# Patient Record
Sex: FEMALE | Race: WHITE | NOT HISPANIC OR LATINO
[De-identification: names, ages, dates, MRNs, and addresses within clinical notes are randomized per-mention and may not be internally consistent; named-entity substitution may affect disease eponyms.]

---

## 2019-05-08 ENCOUNTER — TRANSCRIPTION ENCOUNTER (OUTPATIENT)
Age: 34
End: 2019-05-08

## 2021-05-21 ENCOUNTER — TRANSCRIPTION ENCOUNTER (OUTPATIENT)
Age: 36
End: 2021-05-21

## 2022-10-30 ENCOUNTER — NON-APPOINTMENT (OUTPATIENT)
Age: 37
End: 2022-10-30

## 2022-11-09 ENCOUNTER — OFFICE (OUTPATIENT)
Dept: URBAN - METROPOLITAN AREA CLINIC 97 | Facility: CLINIC | Age: 37
Setting detail: OPHTHALMOLOGY
End: 2022-11-09
Payer: COMMERCIAL

## 2022-11-09 DIAGNOSIS — H52.13: ICD-10-CM

## 2022-11-09 PROCEDURE — 99024 POSTOP FOLLOW-UP VISIT: CPT | Performed by: OPHTHALMOLOGY

## 2022-11-09 ASSESSMENT — VISUAL ACUITY
OS_BCVA: 20/20
OD_BCVA: 20/20

## 2022-11-09 ASSESSMENT — REFRACTION_MANIFEST
OD_AXIS: 180
OS_AXIS: 180
OS_VA1: 20/20
OS_SPHERE: -3.75
OS_CYLINDER: -0.25
OD_SPHERE: -4.00
OD_CYLINDER: -0.50
OU_VA: 20/20
OD_VA1: 20/20

## 2022-11-09 ASSESSMENT — REFRACTION_AUTOREFRACTION
OS_SPHERE: +0.50
OD_AXIS: 094
OS_AXIS: 132
OD_CYLINDER: -0.25
OD_SPHERE: +0.25
OS_CYLINDER: -0.25

## 2022-11-09 ASSESSMENT — KERATOMETRY
OD_K2POWER_DIOPTERS: 4.25
OS_AXISANGLE_DEGREES: 083
OS_K2POWER_DIOPTERS: 43.50
OS_K1POWER_DIOPTERS: 42.75
OD_K1POWER_DIOPTERS: 42.25
OD_AXISANGLE_DEGREES: 085
METHOD_AUTO_MANUAL: AUTO

## 2022-11-09 ASSESSMENT — SPHEQUIV_DERIVED
OD_SPHEQUIV: -4.25
OD_SPHEQUIV: 0.125
OS_SPHEQUIV: 0.375
OS_SPHEQUIV: -3.875

## 2022-11-09 ASSESSMENT — REFRACTION_CURRENTRX
OS_OVR_VA: 20/
OD_VPRISM_DIRECTION: SV
OS_SPHERE: -4.50
OS_VPRISM_DIRECTION: SV
OS_CYLINDER: +0.50
OD_SPHERE: -4.25
OD_CYLINDER: SPHERE
OS_AXIS: 85
OD_OVR_VA: 20/

## 2022-11-09 ASSESSMENT — AXIALLENGTH_DERIVED
OD_AL: 34.3511
OS_AL: 23.5835
OS_AL: 25.363
OD_AL: 38.39

## 2022-11-09 ASSESSMENT — VASCULARIZATION
OS_VASCULARIZATION: PANNUS
OD_VASCULARIZATION: PANNUS

## 2023-02-08 ENCOUNTER — OFFICE (OUTPATIENT)
Dept: URBAN - METROPOLITAN AREA CLINIC 97 | Facility: CLINIC | Age: 38
Setting detail: OPHTHALMOLOGY
End: 2023-02-08
Payer: COMMERCIAL

## 2023-02-08 DIAGNOSIS — H52.13: ICD-10-CM

## 2023-02-08 PROCEDURE — 99024 POSTOP FOLLOW-UP VISIT: CPT | Performed by: OPHTHALMOLOGY

## 2023-02-08 ASSESSMENT — KERATOMETRY
OD_K2POWER_DIOPTERS: 4.25
METHOD_AUTO_MANUAL: AUTO
OD_K1POWER_DIOPTERS: 42.25
OS_K1POWER_DIOPTERS: 42.75
OS_AXISANGLE_DEGREES: 083
OD_AXISANGLE_DEGREES: 085
OS_K2POWER_DIOPTERS: 43.50

## 2023-02-08 ASSESSMENT — REFRACTION_AUTOREFRACTION
OD_AXIS: 094
OS_AXIS: 132
OS_CYLINDER: -0.25
OD_CYLINDER: -0.25
OS_SPHERE: +0.50
OD_SPHERE: +0.25

## 2023-02-08 ASSESSMENT — CONFRONTATIONAL VISUAL FIELD TEST (CVF)
OS_FINDINGS: FULL
OD_FINDINGS: FULL

## 2023-02-08 ASSESSMENT — VISUAL ACUITY
OD_BCVA: 20/20-
OS_BCVA: 20/20

## 2023-02-08 ASSESSMENT — REFRACTION_CURRENTRX
OD_SPHERE: -4.25
OD_CYLINDER: SPHERE
OD_VPRISM_DIRECTION: SV
OS_OVR_VA: 20/
OS_SPHERE: -4.50
OD_OVR_VA: 20/
OS_VPRISM_DIRECTION: SV
OS_CYLINDER: +0.50
OS_AXIS: 85

## 2023-02-08 ASSESSMENT — REFRACTION_MANIFEST
OS_SPHERE: -3.75
OD_SPHERE: -4.00
OS_VA1: 20/20
OD_AXIS: 180
OU_VA: 20/20
OS_AXIS: 180
OD_CYLINDER: -0.50
OS_CYLINDER: -0.25
OD_VA1: 20/20

## 2023-02-08 ASSESSMENT — SPHEQUIV_DERIVED
OD_SPHEQUIV: -4.25
OD_SPHEQUIV: 0.125
OS_SPHEQUIV: 0.375
OS_SPHEQUIV: -3.875

## 2023-02-08 ASSESSMENT — VASCULARIZATION
OS_VASCULARIZATION: PANNUS
OD_VASCULARIZATION: PANNUS

## 2023-02-08 ASSESSMENT — AXIALLENGTH_DERIVED
OS_AL: 25.363
OS_AL: 23.5835
OD_AL: 38.39
OD_AL: 34.3511

## 2023-02-11 ENCOUNTER — APPOINTMENT (OUTPATIENT)
Dept: MAMMOGRAPHY | Facility: CLINIC | Age: 38
End: 2023-02-11
Payer: COMMERCIAL

## 2023-02-11 PROCEDURE — 77063 BREAST TOMOSYNTHESIS BI: CPT

## 2023-02-11 PROCEDURE — 77067 SCR MAMMO BI INCL CAD: CPT

## 2023-03-13 PROBLEM — Z00.00 ENCOUNTER FOR PREVENTIVE HEALTH EXAMINATION: Status: ACTIVE | Noted: 2023-03-13

## 2023-03-16 ENCOUNTER — APPOINTMENT (OUTPATIENT)
Dept: ORTHOPEDIC SURGERY | Facility: CLINIC | Age: 38
End: 2023-03-16
Payer: COMMERCIAL

## 2023-03-16 ENCOUNTER — FORM ENCOUNTER (OUTPATIENT)
Age: 38
End: 2023-03-16

## 2023-03-16 PROCEDURE — 99204 OFFICE O/P NEW MOD 45 MIN: CPT

## 2023-03-16 PROCEDURE — 73562 X-RAY EXAM OF KNEE 3: CPT | Mod: LT

## 2023-03-16 RX ORDER — CHOLECALCIFEROL (VITAMIN D3) 25 MCG
TABLET ORAL
Refills: 0 | Status: ACTIVE | COMMUNITY

## 2023-03-16 RX ORDER — ESCITALOPRAM OXALATE 20 MG/1
TABLET ORAL
Refills: 0 | Status: ACTIVE | COMMUNITY

## 2023-03-16 NOTE — DISCUSSION/SUMMARY
[de-identified] : Patient would benefit from patella stabilizing brace due to patella maltracking, instability, and inability to perform ADLs without pain.

## 2023-03-16 NOTE — PHYSICAL EXAM
[4___] : hamstring 4[unfilled]/5 [Left] : left knee [Lateral] : lateral [[place]] : skyline [AP Standing] : anteroposterior standing [There are no fractures, subluxations or dislocations. No significant abnormalities are seen] : There are no fractures, subluxations or dislocations. No significant abnormalities are seen [] : non-antalgic [FreeTextEntry9] : lateral maltracking patella [TWNoteComboBox7] : flexion 130 degrees

## 2023-03-16 NOTE — HISTORY OF PRESENT ILLNESS
[de-identified] : Patient reports an ACL reconstruction surgery in 2000 following a soccer injury. She was doing well until recently when she went on a ski trip when the knee gave out on her. Since that time she has been experiencing instability in the knee.

## 2023-03-23 ENCOUNTER — APPOINTMENT (OUTPATIENT)
Dept: ORTHOPEDIC SURGERY | Facility: CLINIC | Age: 38
End: 2023-03-23
Payer: COMMERCIAL

## 2023-03-23 VITALS — WEIGHT: 118 LBS | BODY MASS INDEX: 21.71 KG/M2 | HEIGHT: 62 IN

## 2023-03-23 DIAGNOSIS — Z78.9 OTHER SPECIFIED HEALTH STATUS: ICD-10-CM

## 2023-03-23 DIAGNOSIS — M22.42 CHONDROMALACIA PATELLAE, LEFT KNEE: ICD-10-CM

## 2023-03-23 PROCEDURE — 99213 OFFICE O/P EST LOW 20 MIN: CPT

## 2023-03-23 PROCEDURE — L1812: CPT | Mod: LT

## 2023-03-23 NOTE — HISTORY OF PRESENT ILLNESS
[de-identified] : Patient reports an ACL reconstruction surgery in 2000 following a soccer injury. She was doing well until recently when she went on a ski trip when the knee gave out on her. Since that time she has been experiencing instability in the knee. She is here for brace fitting today.

## 2023-05-04 ENCOUNTER — APPOINTMENT (OUTPATIENT)
Dept: ORTHOPEDIC SURGERY | Facility: CLINIC | Age: 38
End: 2023-05-04

## 2023-06-12 ENCOUNTER — OFFICE (OUTPATIENT)
Dept: URBAN - METROPOLITAN AREA CLINIC 97 | Facility: CLINIC | Age: 38
Setting detail: OPHTHALMOLOGY
End: 2023-06-12
Payer: COMMERCIAL

## 2023-06-12 DIAGNOSIS — H52.13: ICD-10-CM

## 2023-06-12 PROCEDURE — 99024 POSTOP FOLLOW-UP VISIT: CPT | Performed by: OPHTHALMOLOGY

## 2023-06-12 ASSESSMENT — REFRACTION_CURRENTRX
OS_VPRISM_DIRECTION: SV
OD_VPRISM_DIRECTION: SV
OD_OVR_VA: 20/
OD_SPHERE: -4.25
OS_AXIS: 85
OS_CYLINDER: +0.50
OD_CYLINDER: SPHERE
OS_SPHERE: -4.50
OS_OVR_VA: 20/

## 2023-06-12 ASSESSMENT — REFRACTION_AUTOREFRACTION
OD_CYLINDER: +0.25
OD_SPHERE: -0.25
OS_SPHERE: 0.00
OD_AXIS: 103
OS_CYLINDER: +0.50
OS_AXIS: 061

## 2023-06-12 ASSESSMENT — PACHYMETRY
OS_CT_CORRECTION: 2
OD_CT_UM: 519
OD_CT_CORRECTION: 2
OS_CT_UM: 515

## 2023-06-12 ASSESSMENT — AXIALLENGTH_DERIVED
OD_AL: 23.7333
OD_AL: 25.4794
OS_AL: 23.4495
OS_AL: 25.1525

## 2023-06-12 ASSESSMENT — KERATOMETRY
METHOD_AUTO_MANUAL: AUTO
OD_K2POWER_DIOPTERS: 43.75
OD_AXISANGLE_DEGREES: 101
OS_K2POWER_DIOPTERS: 44.00
OS_AXISANGLE_DEGREES: 078
OS_K1POWER_DIOPTERS: 43.25
OD_K1POWER_DIOPTERS: 42.75

## 2023-06-12 ASSESSMENT — SPHEQUIV_DERIVED
OS_SPHEQUIV: -3.875
OS_SPHEQUIV: 0.25
OD_SPHEQUIV: -0.125
OD_SPHEQUIV: -4.25

## 2023-06-12 ASSESSMENT — REFRACTION_MANIFEST
OD_SPHERE: -4.00
OU_VA: 20/20
OD_CYLINDER: -0.50
OS_AXIS: 180
OD_AXIS: 180
OS_VA1: 20/20
OD_VA1: 20/20
OS_SPHERE: -3.75
OS_CYLINDER: -0.25

## 2023-06-12 ASSESSMENT — TONOMETRY
OD_IOP_MMHG: 10
OS_IOP_MMHG: 10

## 2023-06-12 ASSESSMENT — VASCULARIZATION
OD_VASCULARIZATION: PANNUS
OS_VASCULARIZATION: PANNUS

## 2023-06-12 ASSESSMENT — VISUAL ACUITY
OD_BCVA: 20/20
OS_BCVA: 20/20

## 2023-07-25 ENCOUNTER — APPOINTMENT (OUTPATIENT)
Dept: ORTHOPEDIC SURGERY | Facility: CLINIC | Age: 38
End: 2023-07-25
Payer: COMMERCIAL

## 2023-07-25 PROCEDURE — 99214 OFFICE O/P EST MOD 30 MIN: CPT

## 2023-07-25 PROCEDURE — 73030 X-RAY EXAM OF SHOULDER: CPT | Mod: RT

## 2023-07-25 NOTE — HISTORY OF PRESENT ILLNESS
-- DO NOT REPLY / DO NOT REPLY ALL --  -- Message is from [employer] [facility] --      Mom is calling to make WCE she has off 3/9/23 and would like all four boys in on that day. Please advise.               [de-identified] : Patient presents for evaluation of RT shoulder pain. Patient states pain started Friday 7/18/23. Pain has been increasingly worse. Has done sculpting, Pilates, yoga. Friday, did a work out despite the pain and Saturday she went axe throwing. Patient has limited ROM. Patient is RHD. Patient states the pain radiates down the arm and to the fingers. Patient claims she takes Tylenol as needed without relief.

## 2023-07-25 NOTE — PHYSICAL EXAM
[4 ___] : forward flexion 4[unfilled]/5 [] : motor and sensory intact distally [Right] : right shoulder [There are no fractures, subluxations or dislocations. No significant abnormalities are seen] : There are no fractures, subluxations or dislocations. No significant abnormalities are seen [TWNoteComboBox7] : active forward flexion 150 degrees [TWNoteComboBox6] : internal rotation sacrum

## 2023-07-25 NOTE — DISCUSSION/SUMMARY
[de-identified] : I reviewed patient's radiographs and discussed her condition and treatment options.  Defer injection.  Start course of NSAIDs.  Follow up with Dr. Thapa in 1 week.  Patient voiced understanding and agreement with the plan.\par

## 2023-07-31 ENCOUNTER — APPOINTMENT (OUTPATIENT)
Dept: ORTHOPEDIC SURGERY | Facility: CLINIC | Age: 38
End: 2023-07-31
Payer: COMMERCIAL

## 2023-07-31 PROCEDURE — 99214 OFFICE O/P EST MOD 30 MIN: CPT | Mod: 25

## 2023-07-31 PROCEDURE — 20611 DRAIN/INJ JOINT/BURSA W/US: CPT | Mod: RT

## 2023-07-31 RX ORDER — KETOROLAC TROMETHAMINE 10 MG/1
10 TABLET, FILM COATED ORAL EVERY 6 HOURS
Qty: 16 | Refills: 0 | Status: DISCONTINUED | COMMUNITY
Start: 2023-07-25 | End: 2023-07-31

## 2023-07-31 NOTE — HISTORY OF PRESENT ILLNESS
[de-identified] : Patient presents complaining of ongoing shoulder pain for the past two weeks. She states that the toradol helped her somewhat but that she still has pain at extreme ranges of motion. She is RHD

## 2023-07-31 NOTE — PHYSICAL EXAM
[4 ___] : forward flexion 4[unfilled]/5 [Right] : right shoulder [There are no fractures, subluxations or dislocations. No significant abnormalities are seen] : There are no fractures, subluxations or dislocations. No significant abnormalities are seen [] : no ecchymosis [TWNoteComboBox7] : active forward flexion 150 degrees [TWNoteComboBox6] : internal rotation sacrum

## 2023-09-20 ENCOUNTER — OFFICE (OUTPATIENT)
Dept: URBAN - METROPOLITAN AREA CLINIC 97 | Facility: CLINIC | Age: 38
Setting detail: OPHTHALMOLOGY
End: 2023-09-20
Payer: COMMERCIAL

## 2023-09-20 DIAGNOSIS — H52.13: ICD-10-CM

## 2023-09-20 PROCEDURE — 99024 POSTOP FOLLOW-UP VISIT: CPT | Performed by: OPHTHALMOLOGY

## 2023-09-20 ASSESSMENT — VISUAL ACUITY
OS_BCVA: 20/20
OD_BCVA: 20/20

## 2023-09-20 ASSESSMENT — REFRACTION_MANIFEST
OS_SPHERE: -3.75
OD_SPHERE: -4.00
OS_AXIS: 180
OD_VA1: 20/20
OS_CYLINDER: -0.25
OD_AXIS: 180
OU_VA: 20/20
OD_CYLINDER: -0.50
OS_VA1: 20/20

## 2023-09-20 ASSESSMENT — AXIALLENGTH_DERIVED
OD_AL: 23.7798
OS_AL: 25.1525
OS_AL: 23.4977
OD_AL: 25.533

## 2023-09-20 ASSESSMENT — REFRACTION_CURRENTRX
OS_OVR_VA: 20/
OS_AXIS: 85
OD_OVR_VA: 20/
OD_SPHERE: -4.25
OD_CYLINDER: SPHERE
OS_VPRISM_DIRECTION: SV
OS_CYLINDER: +0.50
OD_VPRISM_DIRECTION: SV
OS_SPHERE: -4.50

## 2023-09-20 ASSESSMENT — KERATOMETRY
METHOD_AUTO_MANUAL: AUTO
OD_K1POWER_DIOPTERS: 42.75
OD_K2POWER_DIOPTERS: 43.50
OS_AXISANGLE_DEGREES: 079
OS_K2POWER_DIOPTERS: 44.00
OS_K1POWER_DIOPTERS: 43.25
OD_AXISANGLE_DEGREES: 098

## 2023-09-20 ASSESSMENT — PACHYMETRY
OS_CT_UM: 515
OD_CT_CORRECTION: 2
OD_CT_UM: 519
OS_CT_CORRECTION: 2

## 2023-09-20 ASSESSMENT — VASCULARIZATION
OD_VASCULARIZATION: PANNUS
OS_VASCULARIZATION: PANNUS

## 2023-09-20 ASSESSMENT — TONOMETRY
OD_IOP_MMHG: 10
OS_IOP_MMHG: 10

## 2023-09-20 ASSESSMENT — REFRACTION_AUTOREFRACTION
OS_CYLINDER: +0.75
OS_SPHERE: -0.25
OD_AXIS: 099
OD_SPHERE: -0.25
OD_CYLINDER: +0.25
OS_AXIS: 058

## 2023-09-20 ASSESSMENT — SPHEQUIV_DERIVED
OS_SPHEQUIV: 0.125
OD_SPHEQUIV: -4.25
OD_SPHEQUIV: -0.125
OS_SPHEQUIV: -3.875

## 2023-09-27 ENCOUNTER — APPOINTMENT (OUTPATIENT)
Dept: ORTHOPEDIC SURGERY | Facility: CLINIC | Age: 38
End: 2023-09-27
Payer: COMMERCIAL

## 2023-09-27 DIAGNOSIS — M75.21 BICIPITAL TENDINITIS, RIGHT SHOULDER: ICD-10-CM

## 2023-09-27 DIAGNOSIS — M25.811 OTHER SPECIFIED JOINT DISORDERS, RIGHT SHOULDER: ICD-10-CM

## 2023-09-27 PROCEDURE — 99213 OFFICE O/P EST LOW 20 MIN: CPT

## 2024-06-27 ENCOUNTER — APPOINTMENT (OUTPATIENT)
Dept: MAMMOGRAPHY | Facility: CLINIC | Age: 39
End: 2024-06-27
Payer: COMMERCIAL

## 2024-06-27 PROCEDURE — 77067 SCR MAMMO BI INCL CAD: CPT

## 2024-06-27 PROCEDURE — 77063 BREAST TOMOSYNTHESIS BI: CPT

## 2025-07-26 ENCOUNTER — APPOINTMENT (OUTPATIENT)
Dept: MAMMOGRAPHY | Facility: CLINIC | Age: 40
End: 2025-07-26
Payer: COMMERCIAL

## 2025-07-26 PROCEDURE — 77063 BREAST TOMOSYNTHESIS BI: CPT

## 2025-07-26 PROCEDURE — 77067 SCR MAMMO BI INCL CAD: CPT
